# Patient Record
Sex: MALE | Race: WHITE | NOT HISPANIC OR LATINO | Employment: OTHER | URBAN - METROPOLITAN AREA
[De-identification: names, ages, dates, MRNs, and addresses within clinical notes are randomized per-mention and may not be internally consistent; named-entity substitution may affect disease eponyms.]

---

## 2017-05-02 ENCOUNTER — OFFICE VISIT (OUTPATIENT)
Dept: NEUROLOGY | Facility: CLINIC | Age: 72
End: 2017-05-02
Payer: MEDICARE

## 2017-05-02 VITALS
SYSTOLIC BLOOD PRESSURE: 115 MMHG | HEART RATE: 87 BPM | WEIGHT: 151.88 LBS | HEIGHT: 68 IN | BODY MASS INDEX: 23.02 KG/M2 | DIASTOLIC BLOOD PRESSURE: 87 MMHG

## 2017-05-02 DIAGNOSIS — R41.89 COGNITIVE IMPAIRMENT: ICD-10-CM

## 2017-05-02 DIAGNOSIS — G25.5 CHOREA: ICD-10-CM

## 2017-05-02 PROCEDURE — 1126F AMNT PAIN NOTED NONE PRSNT: CPT | Mod: S$GLB,,, | Performed by: PSYCHIATRY & NEUROLOGY

## 2017-05-02 PROCEDURE — 1160F RVW MEDS BY RX/DR IN RCRD: CPT | Mod: S$GLB,,, | Performed by: PSYCHIATRY & NEUROLOGY

## 2017-05-02 PROCEDURE — 99999 PR PBB SHADOW E&M-NEW PATIENT-LVL III: CPT | Mod: PBBFAC,,, | Performed by: PSYCHIATRY & NEUROLOGY

## 2017-05-02 PROCEDURE — 1159F MED LIST DOCD IN RCRD: CPT | Mod: S$GLB,,, | Performed by: PSYCHIATRY & NEUROLOGY

## 2017-05-02 PROCEDURE — 99204 OFFICE O/P NEW MOD 45 MIN: CPT | Mod: S$GLB,,, | Performed by: PSYCHIATRY & NEUROLOGY

## 2017-05-02 NOTE — ASSESSMENT & PLAN NOTE
Mild-moderate diffuse chorea and tics.  Suspicious for HD, but there are other choreas and his father was not definitively diagnosed.   -> after genetic counseling today, patient agrees to genetic testing

## 2017-05-02 NOTE — ASSESSMENT & PLAN NOTE
Attention, in particular, was poor during just casual conversation, so likely cognitive impairment present.   -> neuropsych testing

## 2017-05-02 NOTE — PROGRESS NOTES
"Mehran Loftonsean savage I. Chief Complaints during this visit:  New Patient visit for  HD    Aaareferral Self  No address on file    Primary Care Physician  Riki Gonzalez MD  [address] [id number]          History of present illness:   71 y.o.  Male encouraged by his brother, Kwaku, to come for HD testing.  He is also accompanied by his son, George.  Mehran does not appreciate any physical or mental deficits.  Brother and son have noticed chorea for past couple years.  They do not appreciate any cognitive changes and say that he continues to live alone and manage his own finances.    II.  Review of systems:  As in HPI,  otherwise, balance 10 systems reviewed and are negative.    III.  No past medical history on file.  Family History   Problem Relation Age of Onset    Stephen's disease Father      Social History     Social History    Marital status:      Spouse name: N/A    Number of children: N/A    Years of education: N/A     Social History Main Topics    Smoking status: Never Smoker    Smokeless tobacco: None    Alcohol use No    Drug use: None    Sexual activity: Not Asked     Other Topics Concern    None     Social History Narrative    None         No current outpatient prescriptions on file prior to visit.     No current facility-administered medications on file prior to visit.        PRIOR problem-specific medications tried:  na    Review of patient's allergies indicates:  No Known Allergies    IV. Physical Exam    Vitals:    05/02/17 1609   BP: 115/87   Pulse: 87   Weight: 68.9 kg (151 lb 14.4 oz)   Height: 5' 8" (1.727 m)     General appearance: thin, but well nourished, well developed, no acute distress.         Cardiovascular:  pedal pulses 2, no edema or cyanosis, heart regular rate and rhythym, no carotid bruits.         -------------------------------------------------------------  Facial Expression: normal       Affect: full       Orientation to time & place:  " "Oriented to 2017 (had to look at his appt slip for date), place, person and situation       Attention & concentration:  Decreased attention      Memory:  0/3 at 5 minutes  Language: Spontaneous, fluent; able to repeat and name objects        Fund of knowledge:  Aware of current events        Speech:  normal (not dysarthric)  Thought process is tangential and not always related to discussion.  For example, when discussing his abnormal movements, he felt it necessary to tell me several times that he only hung out with "good people," didn't drink or do drugs.  He also felt it necessary to report that his first wife left him then got  from her second .  -------------------------------------------------------  Cranial nerves: normal visual acuity, visual fields full, optic discs not visualized, pupils equal round and reactive, extraocular movements intact,       facial sensation intact, face symmetrical, hearing intact to whisper, palate raises midline, shoulder shrug strength normal, tongue protrudes midline.        -------------------------------------------------------  Musculoskeletal  Muscle tone: all 4 extremities normal        Muscle Bulk: all 4 extremities normal        Muscle strength:  5/5 in all 4 extremities        No pronator drift  Sensation: Intact to light touch in all extremities        Deep tendon Reflexes: 2+ bilateral biceps, triceps, patella and ankles        --------------------------------------------------------------  Cerebellar and Coordination  Gait:  normal, mild imbalance        Finger-nose: no dysmetria       Rapid Alternating Movements (pronation/supination):  R normal; L normal  --------------------------------------------------------------  MOVEMENT DISORDERS FOCUSED EXAM  Abnormality of movement (bradykinesia, hyperkinesia) present? Yes, chorea and tics of all extremities.  Lots of fiddling with plastic bag.   Tremor present?   No   Posture:  normal  Postural stability:  " no Rhomberg    V.  Laboratory/ Radiological Data:   None available           VI. Assessment and Plan            Problem List Items Addressed This Visit        Unprioritized    Chorea    Overview     Genetic testing pending 5/2/17         Current Assessment & Plan     Mild-moderate diffuse chorea and tics.  Suspicious for HD, but there are other choreas and his father was not definitively diagnosed.   -> after genetic counseling today, patient agrees to genetic testing         Relevant Orders    [place] [facility] Analysis    Cognitive impairment    Current Assessment & Plan     Attention, in particular, was poor during just casual conversation, so likely cognitive impairment present.   -> neuropsych testing         Relevant Orders    Ambulatory consult to Neuropsychology                Return in 2 weeks (on 5/16/2017) for at 3pm for HD test results.

## 2017-05-09 ENCOUNTER — TELEPHONE (OUTPATIENT)
Dept: NEUROLOGY | Facility: CLINIC | Age: 72
End: 2017-05-09

## 2017-05-10 ENCOUNTER — TELEPHONE (OUTPATIENT)
Dept: NEUROLOGY | Facility: HOSPITAL | Age: 72
End: 2017-05-10

## 2017-05-10 ENCOUNTER — TELEPHONE (OUTPATIENT)
Dept: NEUROLOGY | Facility: CLINIC | Age: 72
End: 2017-05-10

## 2017-05-10 NOTE — TELEPHONE ENCOUNTER
----- Message from Mayra Jaramillo sent at 5/9/2017  4:17 PM CDT -----  The pt expressed that he did not want to scheduled an appt at this time & he will call back in the future.    ----- Message -----     From: Little Kovacs MD     Sent: 5/2/2017   5:13 PM       To: [facility] Staff    Testing, pls!  GL

## 2017-05-10 NOTE — TELEPHONE ENCOUNTER
----- Message from Tiffanie Tovar sent at 5/10/2017 10:47 AM CDT -----  Contact: Salvador- [facility]  Would like a direct number for Dr. Kovacs, she would like her personal number.    In regards to above patient. She did not state specific details.     Call: [phone]     [phone]

## 2017-05-25 ENCOUNTER — TELEPHONE (OUTPATIENT)
Dept: NEUROLOGY | Facility: CLINIC | Age: 72
End: 2017-05-25

## 2017-05-30 ENCOUNTER — OFFICE VISIT (OUTPATIENT)
Dept: NEUROLOGY | Facility: CLINIC | Age: 72
End: 2017-05-30
Payer: MEDICARE

## 2017-05-30 ENCOUNTER — TELEPHONE (OUTPATIENT)
Dept: NEUROLOGY | Facility: CLINIC | Age: 72
End: 2017-05-30

## 2017-05-30 VITALS
SYSTOLIC BLOOD PRESSURE: 138 MMHG | BODY MASS INDEX: 23.36 KG/M2 | WEIGHT: 154.13 LBS | HEIGHT: 68 IN | DIASTOLIC BLOOD PRESSURE: 76 MMHG | HEART RATE: 65 BPM

## 2017-05-30 DIAGNOSIS — R41.89 COGNITIVE IMPAIRMENT: ICD-10-CM

## 2017-05-30 DIAGNOSIS — G10 HD (HUNTINGTON CHOREA): Primary | ICD-10-CM

## 2017-05-30 DIAGNOSIS — G10 HUNTINGTON'S DISEASE: ICD-10-CM

## 2017-05-30 PROCEDURE — 1159F MED LIST DOCD IN RCRD: CPT | Mod: S$GLB,,, | Performed by: PSYCHIATRY & NEUROLOGY

## 2017-05-30 PROCEDURE — 99214 OFFICE O/P EST MOD 30 MIN: CPT | Mod: S$GLB,,, | Performed by: PSYCHIATRY & NEUROLOGY

## 2017-05-30 PROCEDURE — 99999 PR PBB SHADOW E&M-EST. PATIENT-LVL II: CPT | Mod: PBBFAC,,, | Performed by: PSYCHIATRY & NEUROLOGY

## 2017-05-30 PROCEDURE — 1126F AMNT PAIN NOTED NONE PRSNT: CPT | Mod: S$GLB,,, | Performed by: PSYCHIATRY & NEUROLOGY

## 2017-05-30 NOTE — ASSESSMENT & PLAN NOTE
He and George deny any issues with cognition, currently, though I suspect there is significant deficits present.  His chorea is mild-moderate and does not seem disabling.   -> neuropsych testing pending   -> HD interdisciplinary clinic later this summer   -> post-test genetic counseling done today, given information on HD and explained next steps.

## 2017-05-30 NOTE — ASSESSMENT & PLAN NOTE
Attention, in particular, was poor during casual conversation, as was short term memory recall and orientation, so suspect a cognitive impairment from HD present.   -> neuropsych testing   -> advised to set up or name financial POA   -> already has medical POA (son, George)   -> lives in his own apartment across [address] from George.  Does not yet need any help or supervision.

## 2017-05-30 NOTE — PROGRESS NOTES
Mehran savage I. Chief Complaints during this visit:  f/u Patient visit for  HD    Riki Gonzalez MD  [address] [facility] [address]  [place] [address]    Primary Care Physician  Riki Gonzalez MD  [address] [facility] [address]  [place] [address]          History of present illness:   71 y.o.  M seen in f/u for HD.  Genetic testing has confirmed diagnosis and today's visit was to review this result.  Accompanied by his son, George, and brother, Edmond, who have tested negative.    Again, Mehran, does not appreciate any physical or cognitive deficits.  Says he still balances his own checkbook and has not had any problems with gait or balance.  Denies depression or anxiety.    George endorses above report, though George's mannerisms and behavior suggest he holds more of a parental role than child role in this relationship.      Interval history 5/2/17:  ...encouraged by his brother, Kwaku, to come for HD testing.  He is also accompanied by his son, George.  Mehran does not appreciate any physical or mental deficits.  Brother and son have noticed chorea for past couple years.  They do not appreciate any cognitive changes and say that he continues to live alone and manage his own finances.      II.  Review of systems:  As in HPI,  otherwise, balance 3 systems reviewed and are negative.    III.  No past medical history on file.  Family History   Problem Relation Age of Onset    Manchester's disease Father      Social History     Social History    Marital status:      Spouse name: N/A    Number of children: N/A    Years of education: N/A     Social History Main Topics    Smoking status: Never Smoker    Smokeless tobacco: None    Alcohol use No    Drug use: Unknown    Sexual activity: Not Asked     Other Topics Concern    None     Social History Narrative    None         No current outpatient prescriptions on file prior to visit.     No current facility-administered medications on file prior to visit.   "      PRIOR problem-specific medications tried:  na    Review of patient's allergies indicates:  No Known Allergies    IV. Physical Exam    Vitals:    05/30/17 1547   BP: 138/76   Pulse: 65   Weight: 69.9 kg (154 lb 1.6 oz)   Height: 5' 8" (1.727 m)     General appearance: thin, but well nourished, well developed, no acute distress.         Cardiovascular:  pedal pulses 2, no edema or cyanosis, heart regular rate and rhythym, no carotid bruits.         -------------------------------------------------------------  Facial Expression: normal       Affect: full       Orientation to time & place:  Oriented to 2017, place, person and situation       Attention & concentration:  Decreased attention      Memory:  0/3 at 5 minutes  Language: Spontaneous, fluent; able to repeat and name objects        Fund of knowledge:  Aware of current events        Speech:  normal (not dysarthric)    Last visit:  Thought process is tangential and not always related to discussion.  For example, when discussing his abnormal movements, he felt it necessary to tell me several times that he only hung out with "good people," didn't drink or do drugs.  He also felt it necessary to report that his first wife left him then got  from her second .  -------------------------------------------------------    Cranial nerves: normal visual acuity, visual fields full, optic discs not visualized, pupils equal round and reactive, extraocular movements intact,       facial sensation intact, face symmetrical, hearing intact to whisper, palate raises midline, shoulder shrug strength normal, tongue protrudes midline.        -------------------------------------------------------  Musculoskeletal  Muscle tone: all 4 extremities normal        Muscle Bulk: all 4 extremities normal        Muscle strength:  5/5 in all 4 extremities        --------------------------------------------------------------  Cerebellar and Coordination  Gait:  normal, mild " imbalance        Finger-nose: no dysmetria       Rapid Alternating Movements (pronation/supination):  R normal; L normal  --------------------------------------------------------------  MOVEMENT DISORDERS FOCUSED EXAM  Abnormality of movement (bradykinesia, hyperkinesia) present? Yes, chorea and tics of all extremities.    Tremor present?   No   Posture:  normal  Postural stability:  no Rhomberg    V.  Laboratory/ Radiological Data:   None available           VI. 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  His chorea is mild-moderate and does not seem disabling.   -> neuropsych testing pending   -> HD interdisciplinary clinic later this summer   -> post-test genetic counseling done today, given information on HD and explained next steps.            2     Cognitive impairment    Current Assessment & Plan     Attention, in particular, was poor during casual conversation, as was short term memory recall and orientation, so suspect a cognitive impairment from HD present.   -> neuropsych testing   -> advised to set up or name financial POA   -> already has medical POA (son, George)   -> lives in his own apartment across [address] from [place].  Does not yet need any help or supervision.           Other Visit Diagnoses    None.               No Follow-up on file.

## 2017-05-30 NOTE — LETTER
May 30, 2017      Riki Gonzalez MD  [address] [facility] [address]  [place] [address]           [facility]  [address]  [place] [address]  Phone: [phone]  Fax: [phone]          Patient: Mehran Perez jr   MR Number: [id number]   YOB: 1945   Date of Visit: 5/30/2017       Dear Dr. Riki Gonzalez:    Thank you for referring Mehran Perez jr to me for evaluation. Attached you will find relevant portions of my assessment and plan of care.    If you have questions, please do not hesitate to call me. I look forward to following Mehran Perez jr along with you.    Sincerely,    Little Kovacs MD    Enclosure  CC:  No Recipients    If you would like to receive this communication electronically, please contact [email or web address] or [phone] to request more information on [employer] access.    For providers and/or their staff who would like to refer a patient to [facility], please contact us through our one-stop-shop provider referral line, [facility], at [phone].    If you feel you have received this communication in error or would no longer like to receive these types of communications, please e-mail [email or web address]

## 2017-06-06 ENCOUNTER — TELEPHONE (OUTPATIENT)
Dept: NEUROLOGY | Facility: CLINIC | Age: 72
End: 2017-06-06

## 2017-06-06 NOTE — TELEPHONE ENCOUNTER
Incoming call from patient and wanted to talk about what is going on with him. He also asked about HD Clinic possible move to afternoon clinic in the future. He does not like coming to appointments in the morning and would like for the suggestion to move HD Clinic to the afternoon

## 2017-06-29 ENCOUNTER — INITIAL CONSULT (OUTPATIENT)
Dept: NEUROLOGY | Facility: CLINIC | Age: 72
End: 2017-06-29
Payer: MEDICARE

## 2017-06-29 DIAGNOSIS — F02.80 MAJOR NEUROCOGNITIVE DISORDER DUE TO HUNTINGTON'S DISEASE: Primary | ICD-10-CM

## 2017-06-29 DIAGNOSIS — R41.89 COGNITIVE IMPAIRMENT: ICD-10-CM

## 2017-06-29 DIAGNOSIS — G10 MAJOR NEUROCOGNITIVE DISORDER DUE TO HUNTINGTON'S DISEASE: Primary | ICD-10-CM

## 2017-06-29 DIAGNOSIS — G10 HUNTINGTON DISEASE: ICD-10-CM

## 2017-06-29 PROCEDURE — 90791 PSYCH DIAGNOSTIC EVALUATION: CPT | Mod: S$GLB,,, | Performed by: CLINICAL NEUROPSYCHOLOGIST

## 2017-06-29 PROCEDURE — 96118 PR NEUROPSYCH TESTING BY PSYCH/PHYS: CPT | Mod: S$GLB,,, | Performed by: CLINICAL NEUROPSYCHOLOGIST

## 2017-06-29 PROCEDURE — 99499 UNLISTED E&M SERVICE: CPT | Mod: S$GLB,,, | Performed by: CLINICAL NEUROPSYCHOLOGIST

## 2017-06-29 PROCEDURE — 96119 PR NEUROPSYCH TESTING BY TECHNICIAN: CPT | Mod: 59,S$GLB,, | Performed by: CLINICAL NEUROPSYCHOLOGIST

## 2017-06-29 NOTE — PROGRESS NOTES
"Outpatient Neuropsychological Evaluation    Referral Information  Name: Mehran Perez jr  MRN: [id number]  ROACH: 2017  : 1945  Age: 71 y.o.  Handedness: Right  Race: White  Gender: male  Referring Provider: [facility]  [address] [place] [address]  Referral Reason/Medical Necessity: Patient has recent HD diagnosis with onset of cognitive/motor sxs for the past several years. Family and medical providers have noticed significant executive dysfunction and he was referred for a neuropsychological evaluation by Neurology to characterize his cognitive status, for differential diagnosis, and for treatment recommendations.   Billing:Total licensed neuropsychologists professional time includes: clinical interview (01044: 60-minutes), test administration and interpretation of tests administered by the billing neuropsychologist, integration of test results and other clinical data, preparing the final report, and personally reporting results to the patient (53893)= 2 hours. Total technician time (04768) = 2 hours.   Consent: The patient expressed an understanding of the purpose of the evaluation and consented to all procedures.    Note: The patient sat down and starting asking if Aiken's was contagious, particularly transmitted sexually.     Current Symptoms/HPI  Cognitive Sxs:  · Attention:   · Per Pt:   No difficulties  · Per Brother: Very disjointed attention  · Mental Speed:   · Per Pt:   No difficulties  · Per Brother: No difficulties  · Memory:  · Per Pt:  When asked about memory difficulties, started to tell me where he lives. Digressed to specific apt location and how his son lives near him. With redirection, he denied memory trouble.   · Per Brother: No major problems with forgetfulness aside from minor lapses  · Language:  · Per Pt:   No difficulties  · Per Brother: Very tangential in conversation and significant difficulty responding directly "to anything." No major speech or " "word finding trouble.  · Visuospatial/Perceptual:  · Per Pt:   No difficulties  · Per Brother: No known difficulties  · Executive Functioning:  · Per Pt:  No difficulties   · Per Brother: Cognitive/executive functions have been declining for at least 10 years; trouble making decisions and reasoning; very tangential in conversations; very disorganized    [Onset/Course]: Patient could not describe onset/course. Brother recently relocated to [facility] after living in [place] for 30+ years. He reports significant denial within patient/patient's family about motor and cognitive sxs 2/2 HD. Brother believes cognitive sxs, especially executive dysfunction, began 10+ years ago and have progressively worsened over time. Chorea sxs began in mid/late 60s and have progressively declined. 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    Neuropsychiatric Sxs:  · Mood:   · Per Pt: "Right now my mood is fine." On most days, mood is fine. When asked about recent HD diagnosis adjustment, he digressed to talking about condo fees.   · Per Brother: Reported patient has lifelong impulsivity particularly with anger/anger expression; his mood and behavior have been "about the same over the last several years"; but no major depression or anxiety;    · Neurovegetative:  · Sleep: "Fine"  · Appetite: "Fine"  · Energy: "Fine"  · Libido: No changes and asked whether his HD could be spread sexually  · Behavioral Concerns:   · Per Pt: Denied  · Delusions: None  · Hallucinations: Denied  · SI/HI: Denied    Physical  · Motor: Per Neurology, "Yes, chorea and tics of all extremities."    · Sensory: No changes  · Pain: None    Current Functioning (I/ADLs):  · ADLs: Independent  · IADLs: Needs assistance  · Finances: Reported that he handles his finances without difficulty  · Medication Mgmt: No meds currently  · Driving: There is concern about his driving per family  · Household Mgmt: [place] from his son and " "receives support; but generally handles household without much assistance      Family History   Problem Relation Age of Onset    Chowan's disease Father      Family Neurologic History: HD (father)  Family Psychiatric History: Negative for heritable risk factors    Developmental/Academic Hx:  Developmental: No gestational or later developmental concerns.  Academic:  · Learning Difficulties: None  · Attention Difficulties: None  · Behavioral Difficulties: None  · Educational Attainment: [facility] + [employer] ([employer] in Marketing) + [employer] at [employer] (digressed about [employer] going to [facility])    Social/Occupational Hx:  Social:  · Current Relationship/Family Status:  for 25 years and ; dating a woman for 15 years; 1 son George and 1 daughter Ping; gets along with his children  · Primary Source of Support: Brother, son, daughter; group of friends and they meet for lunch regularly  · Stressors: None    Occupational Hx:  · Occupational Status: Retired at 66  · Primary Occupation(s): Worked for [employer] for 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    Neurologic History  · TBI:  None  · Seizures: None  · Stroke: None  · Movement Disorder: Yes, HD; chorea began in late 60s    No results found for: [id number]  No results found for: RPR  No results found for: FOLATE  No results found for: TSH, D4EVSJZ, S7ASJLQ, THYROIDAB  No results found for: LABA1C, HGBA1C  No results found for: HIV1X2, ARB30PUHM    Neurodiagnostics  None on file    No current " "outpatient prescriptions on file.    Psychiatric Hx:  · Childhood: None  · Adult: None  · Substance Abuse: No significant history; social drinking occasionally    Social History     Social History Main Topics    Smoking status: Never Smoker    Smokeless tobacco: Not on file    Alcohol use No    Drug use: Unknown    Sexual activity: Not on file       MENTAL STATUS AND OBSERVATIONS:  APPEARANCE: Casually dressed and adequate grooming/hygiene.   ALERTNESS/ORIENTATION: Attentive and alert. Fully oriented (x5) to time and place  GAIT: Slow, but otherwise unremarkable  MOTOR MOVEMENTS/MANNERISMS: Frequent choreiform movements (all extremities) and some facial tics  SPEECH/LANGUAGE: Normal in rate, rhythm, tone, and volume. No significant word finding difficulty noted. Expressive language was normal. Comprehension/receptive language was grossly intact for simple/concrete/repeated questions.  STATED MOOD/AFFECT: The patients stated mood was "alright." Affect was congruent with stated mood.   INTERPERSONAL BEHAVIOR: Rapport was quickly and easily established   SUICIDALITY/HOMICIDALITY: Denied  HALLUCINATIONS/DELUSIONS: None evidenced or endorsed  THOUGHT PROCESSES: Variable insight into HD (stated, "I don't know what I have, maybe Stephen's?"); extremely tangential in conversation; required redirection throughout interview; perseverative about certain topics (e.g., being friends with [employer]).   TEST TAKING BEHAVIOR and VALIDITY: Embedded performance validity measures and observation of effort were suggestive of adequate engagement. The current results, therefore, are likely a valid reflection of the patient's current functioning.     PROCEDURES/TESTS ADMINISTERED:  In addition to performing a review of pertinent medical records, reviewing limits to confidentiality, conducting a clinical interview (with patient and brother), and explaining procedures, the following measures were administered: Mini-Mental State " Examination (MMSE; [place] et al., 1993 norms); Wide Range Achievement Test - Fourth Edition (WRAT-IV; Green Form) Reading Test; Wechsler Adult Intelligence Scale-IV (Digit Span); Trail Making Test, parts A and B ([place] et al., 2004 norms); [place] Naming Test (BNT-60; Victorina et al., 2004 norms) Category and Letter-cued verbal fluency (animal naming/FAS; Victorina et al., 2004 norms); Chirinos Verbal Learning Test - Revised (Form-1); WMS-IV Logical Memory (Older Adult Battery); Clock Drawing; SDMT; [place] Card Sorting Test-64; Grooved Pegboard Test (Victorina et al., 2004 norms); Geriatric Depression Scale (GDS-30); ARCADIO-7 Manual norms were used unless otherwise indicated.      TEST RESULTS  Percentile Interpretation:        </=3rd......................................Abnormal        4th-9th.....................................Borderline Abnormal        10th-24th...................................Low Average        25th-74th...................................Average        75th-90th...................................High Average        91st-97th...................................Superior        >/=97th.....................................Very Superior        SCREENING OF GENERAL COGNITIVE FUNCTIONING:    MMSE (total score/%ile):     Total Score (max = 30)...............28 / WNL  Orientation to time..................5 / 5  Orientation to place.................5 / 5    PANFILO (total score/descriptor):........14 / Impaired       ESTIMATED FSIQ and READING LEVEL:      WRAT-4 (Raw/SS)..................60 / 101     AUDITORY ATTENTION AND WORKING MEMORY    AIFX-RP-Ixkfh Span        Forward raw.........................10 / 50th      Forward span.........................7 /       Backward raw.........................6 / 25th      Backward span........................3 /       Sequencing raw.......................7 / 50th      Sequencing span......................5 /       Overall (SS/percentile)..............9 / 37th      MOTOR AND  ORAL PROCESSING SPEED     Trail Making Test (sec. to completion/percentile):        Part A .....................................51 / 10th          Errors..................................0 /       SDMT (total correct/percentile):        Oral Form...................................21 / 1st      Written Form................................13 / <1st    MOTOR FUNCTIONS    Grooved Pegboard (sec. to completion/%ile)        Dominant (Right) Hand.......................182 / <1st      Non-dominant (Left) Hand....................330 / <1st    LANGUAGE FUNCTIONING    WORD PRODUCTIVITY    Verbal Fluency Tests (raw/percentiles):        FAS.........................................15 / 1st      Animals.....................................10 / <1st      CONFRONTATION NAMING    [place] Naming Test (raw/percentile)        Total Spontaneous (max. = 60)...............54/ 24th      CONSTRUCTIONAL PRAXIS     Clock Drawing:        Request (max. = 5)...........................4 / WNL      Copy (max. = 5)..............................5 / WNL      VERBAL LEARNING AND MEMORY OF PROSE PASSAGES    WMS-IV (raw score/percentile):        Logical Memory I............................23 / 16th      Logical Memory II............................6 / 5th      Recognition..................................15 / 10-16%      VERBAL LEARNING AND MEMORY OF A WORDLIST    Chirinos Verbal Learning Test - Revised (raw score/percentile):        Form: 1        Trial 1......................................2 /       Trial 2......................................5 /       Trial 3......................................6 /       Total Recall................................13 / 2nd      Delayed Recall...............................4 / 4th      Recognition:            Hits.....................................11 /           False-Positives..........................1 /           Discrimination Index.....................10 / 38th      EXECUTIVE FUNCTIONING    SET-SHIFTING         Trail Making Test (sec. to completion/%ile):        Part B ......................................272 / <1st          Errors...................................0 /     WORD PRODUCTIVITY    Verbal Fluency Tests (raw/percentiles):        FAS.........................................15 / 1st      Animals.....................................10 / <1st      Clock Drawing:        Request (max. = 5)...........................4 / WNL    PANFILO (total score/descriptor):........14 / Impaired      SELF-REPORTED MOOD  ARCADIO-7..........................................0 / No sig anxiety  GDS.............................................0 / No sig depression    OVERALL SUMMARY  Patient has recent HD diagnosis with onset of cognitive/motor sxs for the past several years. Family and medical providers have noticed significant executive dysfunction and he was referred for a neuropsychological evaluation by Neurology to characterize his cognitive status, for differential diagnosis, and for treatment recommendations.The patient's baseline or pre-morbid intellectual functioning was estimated to be in the average range based on educational/occupational history and performance on a word reading measure. Results should be interpreted in that context.    Basic mental status is normal (MMSE=28/30) and he is generally oriented to time/place. Attention/working memory are normal. Mental and motor speed are severely impaired (e.g., very slowed thinking). Basic expressive speech is normal. His ability to understand simple/concrete questions is normal. More complex questions require significant repetition and less abstraction for optimal understanding. He is extremely tangential in conversation and has trouble responding in a linear/logical way. Object naming is normal. Verbal fluency is impaired for both phonemic and semantic conditions. Basic visuoperception is normal.     Learning and memory assessment revealed important findings. He was able to learn  information slightly better (low-average range) when information was pre-structured/organized. With reduced organization/structure (e.g., increased executive functioning demands), his learning was impaired. Recall was borderline impaired for both measures. Fortunately, recognition cues tended to improve his memory. Executive functions were global impaired on a brief screening (PANFILO), on a set-shifting task, and for verbal/generative fluency.    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 In particular, his profile revealed a frontal-subcortical pattern of cognitive dysfunction (e.g., significant executive dysfunction, very slow processing speed, retrieval-based memory deficits). Given the patient's diminished insight and resistance to help/assistance from family, ongoing cognitive monitoring will be helpful particularly through the HD Interdisciplinary Clinic. I provided feedback to patient and his brother. Patient was very resistant to diagnosis and need for additional assistance. I can consult when attends HD Clinic as needed.    Referral Dx:  R41.89 (ICD-10-CM) - Cognitive impairment    Consult Dx:  Major Neurocognitive Disorder due to HD    MARITZA Parkinson II, Ph.D.  Clinical Neuropsychologist  [facility] - Department of Neurology    RECOMMENDATIONS  1-NEUROLOGY FOLLOW-UP: Per Dr. Kovacs and HD Clinic Follow-up  2-NEUROPSYCHOLOGICAL RE-EVALUATION: In 12-months  3. Supervision: Family sanaz discuss his functional needs to determine optimal supervision. Continued discussions over time will be important to determine appropriate supervision/assistance.   4. Stop Driving: The patient should stop driving and have a driving evaluation.   5. Legal Issues: Issues such as a will, financial POA, and health care POA should be explored, if not already done so.   6. Dementia and Cognitive Symptom Management: I provided our lengthy recommendations sheet for patients and  caregivers. I discussed various strategies to assist with management of his cognitive problems, particularly executive dysfunction.

## 2017-07-11 ENCOUNTER — TELEPHONE (OUTPATIENT)
Dept: NEUROLOGY | Facility: CLINIC | Age: 72
End: 2017-07-11

## 2017-07-11 NOTE — TELEPHONE ENCOUNTER
Incoming e-mail this morning from patient's brother whom has POA in regards to appointment. See below:     Nadia,     Despite my wife and I making multiple attempts to persuade him otherwise, Gene insists that he will not participating in the HD Interdisciplinary Clinic on Tuesday.     He is, indeed, unwell and in denial. We are exhausted and utterly frustrated by his refusal to accept this reality that confronts him.     He claims that he will give it a year and then reconsider things.     Please accept our gratitude and convey it to everyone who has been involved in his case.     Best regards,     Edmond Perez   [address] [facility] [address]   [place] [address]   [phone]  [email or web address]

## 2020-02-08 ENCOUNTER — OCCUPATIONAL HEALTH (OUTPATIENT)
Dept: URGENT CARE | Facility: CLINIC | Age: 75
End: 2020-02-08

## 2020-02-08 DIAGNOSIS — Z11.1 SCREENING FOR TUBERCULOSIS: Primary | ICD-10-CM

## 2020-02-08 PROCEDURE — 86580 TB INTRADERMAL TEST: CPT | Mod: S$GLB,,, | Performed by: NURSE PRACTITIONER

## 2020-02-08 PROCEDURE — 86580 POCT TB SKIN TEST: ICD-10-PCS | Mod: S$GLB,,, | Performed by: NURSE PRACTITIONER
